# Patient Record
Sex: FEMALE | Race: WHITE | NOT HISPANIC OR LATINO | Employment: OTHER | URBAN - METROPOLITAN AREA
[De-identification: names, ages, dates, MRNs, and addresses within clinical notes are randomized per-mention and may not be internally consistent; named-entity substitution may affect disease eponyms.]

---

## 2021-10-14 ENCOUNTER — TELEPHONE (OUTPATIENT)
Dept: NEUROLOGY | Facility: OTHER | Age: 76
End: 2021-10-14

## 2021-10-14 NOTE — TELEPHONE ENCOUNTER
Patient called to ask if rx was being sent in from . I let her know we sent an rx in today to [facility] on [facility].

## 2021-11-19 ENCOUNTER — TELEPHONE (OUTPATIENT)
Dept: NEUROLOGY | Facility: CLINIC | Age: 76
End: 2021-11-19

## 2021-11-19 NOTE — TELEPHONE ENCOUNTER
Caller: ROBBIE    Relationship: SELF    Best call back number: [phone]    What is the best time to reach you: ANYTIME    Do you know the name of the person who called: NO    What was the call regarding: SHE IS NOT SURE WHO CALLED HER, SHE STATES SHE JUST HAS A MISSED CALL. PLEASE REVIEW AND ADVISE.

## 2021-11-19 NOTE — TELEPHONE ENCOUNTER
Provider: DR. CORRIGAN    Caller: ROBBIE    Relationship to Patient: SELF    Phone Number: [phone]    Reason for Call: PT STATES THAT SHE HAD MISSED A CALL FROM DR. CORRIGAN AND WAS WAITING FOR HIM TO CALL HER BACK.    THEN PT STATED THAT DR. CORRIGAN WAS SUPPOSED TO CONTACT HER ON MON IN REGARDS TO SOME TESTING SHE HAD COMPLETED AND THEY WERE SUPPOSED TO GO OVER RESULTS.  COULD NOT FIND IN CHART WHERE ANYONE HAD CONTACTED PT, BUT SHE IS ASKING FOR A CALL BACK.

## 2021-11-22 NOTE — TELEPHONE ENCOUNTER
Patient was called again and she will go next Thursday to have her Lab work done so it is back before her follow up appoitnment on 12/15/2021 ,Nikki Kurtz

## 2021-11-22 NOTE — TELEPHONE ENCOUNTER
Spoke to her and informed her she is doing a little better and will re-chavira her Labwork when she comes in ,in December 2021 ,Nikki

## 2021-12-17 ENCOUNTER — TELEPHONE (OUTPATIENT)
Dept: NEUROLOGY | Facility: CLINIC | Age: 76
End: 2021-12-17

## 2021-12-17 NOTE — TELEPHONE ENCOUNTER
Caller: REBECA PENN    Relationship: Emergency Contact; SON    Best call back number: [phone]    What was the call regarding: PT'S SON CALLED STATING HE HAS BEEN UNABLE TO ATTEND PT'S NEURO APPTS WITH HER & HE IS WONDERING IF SOMEONE FROM THE OFFICE CAN TOUCH BASES WITH HIM WITH EXPLANATION OF PT'S DIAGNOSES AND WHAT PT'S TREATMENT LOOKS LIKE AT THIS TIME.     PT'S SON STATES HE TRIED DISCUSSING WITH HIS AUNT (PT'S SISTER) WHO ATTENDS APPTS WITH HER BUT THINGS WERE STILL UNCLEAR.    Do you require a callback: YES, PLEASE.    PLEASE REVIEW AND ADVISE.

## 2022-03-07 ENCOUNTER — TELEPHONE (OUTPATIENT)
Dept: NEUROLOGY | Facility: CLINIC | Age: 77
End: 2022-03-07

## 2022-03-07 NOTE — TELEPHONE ENCOUNTER
So I called the sister and explained but what she really wanted to know if she has such severe constipation and I explained to her that is something she has to discuss with her PCP and if she has any other medical concerns please too,unfortunately we only work with the nerves and not all the other Issues ,rodriguez Kurtz

## 2022-03-07 NOTE — TELEPHONE ENCOUNTER
Caller: Genesis Darden    Relationship: Self    Best call back number: [phone] OR SISTERS PHONE # [phone]    What medications are you currently taking:   Current Outpatient Medications on File Prior to Visit   Medication Sig Dispense Refill   • alendronate (FOSAMAX) 70 MG tablet TAKE 1 TABLET BY MOUTH EACH WEEK     • Bioflavonoid Products (VITAMIN C PLUS) 1000 MG tablet Take 1,000 mg by mouth Daily.     • Calcium Carbonate-Vit D-Min (CALCIUM 1200 PO) Take  by mouth Daily.     • Cholecalciferol (VITAMIN D3) 2000 units tablet Take 2,000 Units by mouth Daily.     • cyanocobalamin 1000 MCG/ML injection Inject 1,000 mcg into the appropriate muscle as directed by prescriber Every 30 (Thirty) Days.     • latanoprost (XALATAN) 0.005 % ophthalmic solution Administer 1 drop to both eyes every night at bedtime.     • lubiprostone (Amitiza) 24 MCG capsule Take 1 capsule by mouth 2 (Two) Times a Day With Meals. 60 capsule 11   • methylPREDNISolone (MEDROL) 4 MG dose pack Take as directed on package instructions. 1 each 0     Current Facility-Administered Medications on File Prior to Visit   Medication Dose Route Frequency Provider Last Rate Last Admin   • cyanocobalamin injection 1,000 mcg  1,000 mcg Intramuscular Q28 Days Jackson Rucker II, MD   1,000 mcg at 12/22/21 1010          When did you start taking these medications: NA    Which medication are you concerned about: MEDROL PACK    Who prescribed you this medication:     What are your concerns: PATIENT STATES SHE IS CLOSE TO FINISHING STEROID PACK. SHE STATES SHE TAKES A MEDICATION EVERY Monday CALLED ALENDRONATE AND SHE WANTS TO MAKE SURE ITS OKAY TO TAKE THAT WITH HER STEROID PACK. PLEASE ADVISE.     How long have you had these concerns: NA

## 2022-09-20 ENCOUNTER — TELEPHONE (OUTPATIENT)
Dept: GASTROENTEROLOGY | Facility: CLINIC | Age: 77
End: 2022-09-20

## 2022-09-20 NOTE — TELEPHONE ENCOUNTER
"See notes of 9/13 /22: \"We will give her samples of Linzess 145 daily to trial since she cannot remember how it worked last year.\"    Call to pt.  States took linzess as above - worked well for first 3 days, and then developed \"constant diarrhea\".  Quit taking today, and no further diarrhea.    Update to JAC Verma.   "

## 2022-09-20 NOTE — TELEPHONE ENCOUNTER
I would have her take the Linzess 145 every other day.  If that still causes too much diarrhea we can give her samples of the Linzess 72 to trial instead.

## 2022-09-20 NOTE — TELEPHONE ENCOUNTER
Caller: Genesis Darden    Relationship: Self    Best call back number: [phone]    What is the best time to reach you: ANYTIME    Who are you requesting to speak with (clinical staff, provider,  specific staff member): CLINICAL STAFF     What was the call regarding: PT CALLED AND SAID NIA WU PRESCRIBED HER A MEDICATION TO TRY AND WANTED PT TO CALL BACK AND LET HER KNOW HOW IT WAS GOING. PT STATED THE MEDICATION IS GIVING HER DIARRHEA AND WAS WONDERING IF THERE WAS SOMETHING ELSE THAT COULD BE PRESCRIBED.     Do you require a callback: YES

## 2022-09-21 ENCOUNTER — TELEPHONE (OUTPATIENT)
Dept: GASTROENTEROLOGY | Facility: CLINIC | Age: 77
End: 2022-09-21

## 2022-09-21 NOTE — TELEPHONE ENCOUNTER
Caller: Genesis Darden    Relationship to patient: Self    Best call back number: [phone]     Patient is needing: PATIENT MISSED NURSE CALL AND IS REQUESTING CALL BACK.

## 2022-09-23 ENCOUNTER — TELEPHONE (OUTPATIENT)
Dept: GASTROENTEROLOGY | Facility: CLINIC | Age: 77
End: 2022-09-23

## 2022-09-23 NOTE — TELEPHONE ENCOUNTER
Caller: Genesis Darden    Relationship: Self    Best call back number: [phone]    What is the best time to reach you: ANYTIME    Who are you requesting to speak with (clinical staff, provider,  specific staff member): CLINICAL STAFF        What was the call regarding: PT IS HAVING TROUBLE WITH CONSTIPATION AND PT WAS PRESCRIBED LINZESS 145 MG AND IT GAVE PT DIARRHEA. PT WAS TOLD TO TAKE THEM ONCE EVERY 2 DAYS AND IT STILL GIVES PT DIARRHEA.

## 2022-09-23 NOTE — TELEPHONE ENCOUNTER
I would take her down then to the Numerous 72 daily and try that instead.  If she can come by and  samples that would be great.  If she would rather me send in a prescription I can do that instead.

## 2022-09-23 NOTE — TELEPHONE ENCOUNTER
"Called pt and advised of Guillermina Np\"s note.Advised we will have samples at  for her . Verb understanding .  "

## 2022-10-04 ENCOUNTER — TELEPHONE (OUTPATIENT)
Dept: GASTROENTEROLOGY | Facility: CLINIC | Age: 77
End: 2022-10-04

## 2022-10-04 NOTE — TELEPHONE ENCOUNTER
Caller: Genesis Darden    Relationship to patient: Self    Best call back number: [phone]    Patient is needing: PATIENT MISSED NURSE CALL AND IS REQUESTING CALL BACK.

## 2022-10-04 NOTE — TELEPHONE ENCOUNTER
Caller: Genesis Darden    Relationship: Self    Best call back number:[phone]    What is the best time to reach you: ANY    Who are you requesting to speak with (clinical staff, provider,  specific staff member): BRYCE    Do you know the name of the person who called:     What was the call regarding:GEMTESA 75 MG IS GIVING HER BAD DIARREAH PTWOULD LIKE TO KNOW IF THERE IS SOMETHING ELSE SHE CAN TAKE    Do you require a callback: YES

## 2022-10-05 ENCOUNTER — TELEPHONE (OUTPATIENT)
Dept: GASTROENTEROLOGY | Facility: CLINIC | Age: 77
End: 2022-10-05

## 2022-10-05 NOTE — TELEPHONE ENCOUNTER
Called pt and pt reports that the linzess 72mcg also gave her diarrhea. She took it 3 times and she quit it.  While taking the linzess pt reports having at least 6 very loose to watery stools per day.  Pt had last dose Sunday. Pt has not had a bm since Sunday.  Pt asking what should she do. Advised will send message to Guillermina Anderson.

## 2022-10-05 NOTE — TELEPHONE ENCOUNTER
Caller: Genesis Darden    Relationship: Self    Best call back number: [phone]    What is the best time to reach you: ANYTIME    Who are you requesting to speak with (clinical staff, provider,  specific staff member): CLINICAL STAFF

## 2022-10-05 NOTE — TELEPHONE ENCOUNTER
Called pt and she has ab 5tabs of the linzess 72mg.  She is willing to take it every 3 days to see if this with help, she will start this tomorrow.  That should get her through to her apt on 10/12.

## 2022-10-17 ENCOUNTER — TELEPHONE (OUTPATIENT)
Dept: GASTROENTEROLOGY | Facility: CLINIC | Age: 77
End: 2022-10-17

## 2022-10-17 NOTE — TELEPHONE ENCOUNTER
Can she try taking it every other day or every third day before we give up on it?  Thanks Guillermina ABRAHAM.      Called pt and advised of the above. Verb understanding.

## 2022-10-17 NOTE — TELEPHONE ENCOUNTER
Provider: NIA WU  Caller: ROBBIE PENN  Relationship to Patient: SELF  Phone Number: [phone]  Reason for Call: PATIENT CALLED TO INFORM THE MEDICATION TRULANCE IS GIVING HER DIARRHEA.   PT STATED THIS IS THE THIRD MEDICATION TRIED AND ALL HAVE GIVEN PATIENT DIARRHEA. NEED TO KNOW WHAT TO DO. PLEASE CALL AND ADVISE.

## 2023-02-08 ENCOUNTER — TELEPHONE (OUTPATIENT)
Dept: NEUROLOGY | Facility: CLINIC | Age: 78
End: 2023-02-08

## 2023-02-08 NOTE — TELEPHONE ENCOUNTER
Caller: ROBBIE Rubio call back number: [phone]    What was the call regarding: PT CALLING TO FIND OUT THE NEURO MUSCLE DR YOU WERE REF HER TO ?  ALSO NEEDS  NUMBER SO CAN CALL TO SCHED?    Do you require a callback: YES ASAP     PLEASE ADVISE.

## 2023-10-17 ENCOUNTER — TELEPHONE (OUTPATIENT)
Dept: GASTROENTEROLOGY | Facility: CLINIC | Age: 78
End: 2023-10-17
Payer: MEDICARE

## 2023-10-23 ENCOUNTER — ANESTHESIA (OUTPATIENT)
Dept: GASTROENTEROLOGY | Facility: HOSPITAL | Age: 78
End: 2023-10-23
Payer: MEDICARE

## 2023-10-23 ENCOUNTER — ANESTHESIA EVENT (OUTPATIENT)
Dept: GASTROENTEROLOGY | Facility: HOSPITAL | Age: 78
End: 2023-10-23
Payer: MEDICARE

## 2023-10-23 ENCOUNTER — HOSPITAL ENCOUNTER (OUTPATIENT)
Facility: HOSPITAL | Age: 78
Setting detail: HOSPITAL OUTPATIENT SURGERY
Discharge: HOME | End: 2023-10-23
Attending: INTERNAL MEDICINE | Admitting: INTERNAL MEDICINE
Payer: MEDICARE

## 2023-10-23 VITALS
WEIGHT: 126 LBS | HEART RATE: 81 BPM | OXYGEN SATURATION: 99 % | RESPIRATION RATE: 16 BRPM | DIASTOLIC BLOOD PRESSURE: 69 MMHG | BODY MASS INDEX: 24.74 KG/M2 | HEIGHT: 60 IN | SYSTOLIC BLOOD PRESSURE: 126 MMHG

## 2023-10-23 DIAGNOSIS — R19.5 POSITIVE COLORECTAL CANCER SCREENING USING COLOGUARD TEST: ICD-10-CM

## 2023-10-23 PROCEDURE — 25810000003 LACTATED RINGERS PER 1000 ML: Performed by: INTERNAL MEDICINE

## 2023-10-23 PROCEDURE — 25010000002 PROPOFOL 10 MG/ML EMULSION: Performed by: NURSE ANESTHETIST, CERTIFIED REGISTERED

## 2023-10-23 PROCEDURE — 45380 COLONOSCOPY AND BIOPSY: CPT | Performed by: INTERNAL MEDICINE

## 2023-10-23 PROCEDURE — 88305 TISSUE EXAM BY PATHOLOGIST: CPT | Performed by: INTERNAL MEDICINE

## 2023-10-23 RX ORDER — LIDOCAINE HYDROCHLORIDE 20 MG/ML
INJECTION, SOLUTION INFILTRATION; PERINEURAL AS NEEDED
Status: DISCONTINUED | OUTPATIENT
Start: 2023-10-23 | End: 2023-10-23 | Stop reason: SURG

## 2023-10-23 RX ORDER — SODIUM CHLORIDE, SODIUM LACTATE, POTASSIUM CHLORIDE, CALCIUM CHLORIDE 600; 310; 30; 20 MG/100ML; MG/100ML; MG/100ML; MG/100ML
30 INJECTION, SOLUTION INTRAVENOUS CONTINUOUS PRN
Status: DISCONTINUED | OUTPATIENT
Start: 2023-10-23 | End: 2023-10-23 | Stop reason: HOSPADM

## 2023-10-23 RX ORDER — PROPOFOL 10 MG/ML
VIAL (ML) INTRAVENOUS AS NEEDED
Status: DISCONTINUED | OUTPATIENT
Start: 2023-10-23 | End: 2023-10-23 | Stop reason: SURG

## 2023-10-23 RX ADMIN — PROPOFOL 300 MCG/KG/MIN: 10 INJECTION, EMULSION INTRAVENOUS at 12:59

## 2023-10-23 RX ADMIN — SODIUM CHLORIDE, POTASSIUM CHLORIDE, SODIUM LACTATE AND CALCIUM CHLORIDE 30 ML/HR: 600; 310; 30; 20 INJECTION, SOLUTION INTRAVENOUS at 12:41

## 2023-10-23 RX ADMIN — PROPOFOL 100 MG: 10 INJECTION, EMULSION INTRAVENOUS at 12:59

## 2023-10-23 RX ADMIN — LIDOCAINE HYDROCHLORIDE 100 MG: 20 INJECTION, SOLUTION INFILTRATION; PERINEURAL at 12:59

## 2023-10-23 NOTE — ANESTHESIA PREPROCEDURE EVALUATION
Anesthesia Evaluation     NPO Solid Status: > 8 hours  NPO Liquid Status: > 8 hours           Airway   Mallampati: I  No difficulty expected  Dental      Pulmonary    Cardiovascular   Exercise tolerance: good (4-7 METS)        Neuro/Psych  (+) headaches, numbness  GI/Hepatic/Renal/Endo    (+) GERD    Musculoskeletal     Abdominal    Substance History      OB/GYN          Other   arthritis,   history of cancer                Anesthesia Plan    ASA 3     MAC     intravenous induction     Anesthetic plan, risks, benefits, and alternatives have been provided, discussed and informed consent has been obtained with: patient.    CODE STATUS:

## 2023-10-23 NOTE — DISCHARGE INSTRUCTIONS
For the next 24 hours patient needs to be with a responsible adult.    For 24 hours DO NOT drive, operate machinery, appliances, drink alcohol, make important decisions or sign legal documents.    Start with a light or bland diet if you are feeling sick to your stomach otherwise advance to regular diet as tolerated.    Follow recommendations on procedure report if provided by your doctor.    Call Dr Oneill for problems [phone]     Problems may include but not limited to: large amounts of bleeding, trouble breathing, repeated vomiting, severe unrelieved pain, fever or chills.

## 2023-10-23 NOTE — H&P
Chief Complaint   Patient presents with    Irritable bowel syndrome with constipation         History of Present Illness:   78 y.o. female with chronic constipation.       Her last colonoscopy was in 2019. She does have a history of hyperplastic colon polyps.        C/o fatigue. C/o consitaption x yrs. She takes Ducolax prn. She takes Metamucil gummies. No rectal bleeding or melena. NO abdominal or chest pain. NO nausea or vomtiing. WEight stable.      Medical History        Past Medical History:   Diagnosis Date    Abnormal finding on diagnostic imaging of right kidney       CT 3/2019 IN [facility]    Acid reflux       ON OCC    Arthritis       OSTEO    Cancer      Colon polyp       REMOVED. BENIGN     Constipation      Constipation      CTS (carpal tunnel syndrome)      DDD (degenerative disc disease), lumbosacral      Difficulty walking      Eczema      Headache, tension-type      Hearing loss       WITH MIGUEL HEARING AIDS    Insomnia      Leg weakness, bilateral      Low back pain      Osteoporosis      Overactive bladder      Spinal stenosis      Unsteady gait              Surgical History         Past Surgical History:   Procedure Laterality Date    ANTERIOR CERVICAL FUSION         WITH HARDWARE    BREAST BIOPSY Right       BENIGN    CARPAL TUNNEL RELEASE Bilateral       SECTION         X2    COLONOSCOPY   2013     Diverticulosis, IH, melanosis    COLONOSCOPY N/A 04/15/2019     Diverticulosis, Three 3 to 4 mm polyps in the rectum, IH. Path: Fragments of developing hyperplastic polyp.    CYSTOSCOPY URETEROSCOPY LASER LITHOTRIPSY Right 05/15/2019     Procedure: URETEROSCOPY,CYSTOSCOPY, RIGHT RETROGRADE ;  Surgeon: Enmanuel Sepulveda MD;  Location: [facility]  Service: Urology    FOOT SURGERY Right      HEMORROIDECTOMY        HYSTERECTOMY         ABD WITH REMOVAL OF ONE OVARY    THORACIC LAMINECTOMY N/A 2022     Procedure: Thoracic 10 to thoracic 11 laminectomy using metrx;  Surgeon: Cole  Lonnie CHURCHILL MD;  Location: [facility] OR;  Service: Neurosurgery;  Laterality: N/A;               Current Outpatient Medications:     alendronate (FOSAMAX) 70 MG tablet, Take 1 tablet by mouth Every 7 (Seven) Days., Disp: , Rfl:     Bioflavonoid Products (VITAMIN C PLUS) 1000 MG tablet, Take 1,000 mg by mouth Daily. HOLD FOR SURGERY, Disp: , Rfl:     BISACODYL CO, 1 tablet by Combination route Every Night., Disp: , Rfl:     Calcium Carbonate-Vit D-Min (CALCIUM 1200 PO), Take 1 tablet by mouth Daily. HOLD FOR SURGERY, Disp: , Rfl:     Cholecalciferol (VITAMIN D3) 2000 units tablet, Take 1 tablet by mouth Daily. HOLD FOR SURGERY, Disp: , Rfl:     latanoprost (XALATAN) 0.005 % ophthalmic solution, Administer 1 drop to both eyes every night at bedtime., Disp: , Rfl:     melatonin 5 MG tablet tablet, Take 1 tablet by mouth At Night As Needed., Disp: , Rfl:     vitamin B-12 (CYANOCOBALAMIN) 1000 MCG tablet, Take 1 tablet by mouth Daily. HOLD FOR SURGERY (Patient not taking: Reported on 9/7/2023), Disp: , Rfl:            Allergies   Allergen Reactions    Pyridostigmine Bromide Arrhythmia       Patient states it hurts her stomach a lot                Family History   Problem Relation Age of Onset    Hypertension Mother      Arthritis Mother      Arthritis Sister      Hyperlipidemia Sister      Hypertension Brother      Diabetes Brother      Malig Hyperthermia Neg Hx           Social History   Social History            Socioeconomic History    Marital status:    Tobacco Use    Smoking status: Never    Smokeless tobacco: Never   Vaping Use    Vaping Use: Never used   Substance and Sexual Activity    Alcohol use: Yes       Comment: socially     Drug use: No            Review of Systems   Gastrointestinal:  Positive for constipation.   All other systems reviewed and are negative.  Pertinent positives and negatives documented in the HPI and all other systems reviewed and were found to be negative.      Vitals:     09/07/23  1117   BP: 137/82   Pulse: 80   Temp: 97.1 °F (36.2 °C)   SpO2: 97%         Physical Exam  Vitals reviewed.   Constitutional:       General: She is not in acute distress.     Appearance: Normal appearance. She is well-developed. She is not diaphoretic.   HENT:      Head: Normocephalic and atraumatic. Hair is normal.      Right Ear: Hearing, tympanic membrane, ear canal and external ear normal. No decreased hearing noted. No drainage.      Left Ear: Hearing, tympanic membrane, ear canal and external ear normal. No decreased hearing noted.      Nose: Nose normal. No nasal deformity.      Mouth/Throat:      Mouth: Mucous membranes are moist.   Eyes:      General: Lids are normal.         Right eye: No discharge.         Left eye: No discharge.      Extraocular Movements: Extraocular movements intact.      Conjunctiva/sclera: Conjunctivae normal.      Pupils: Pupils are equal, round, and reactive to light.   Neck:      Thyroid: No thyromegaly.      Vascular: No JVD.      Trachea: No tracheal deviation.   Cardiovascular:      Rate and Rhythm: Normal rate and regular rhythm.      Pulses: Normal pulses.      Heart sounds: Normal heart sounds. No murmur heard.    No friction rub. No gallop.   Pulmonary:      Effort: Pulmonary effort is normal. No respiratory distress.      Breath sounds: Normal breath sounds. No wheezing or rales.   Chest:      Chest wall: No tenderness.   Abdominal:      General: Bowel sounds are normal. There is no distension.      Palpations: Abdomen is soft. There is no mass.      Tenderness: There is no abdominal tenderness. There is no guarding or rebound.      Hernia: No hernia is present.   Genitourinary:     Rectum: Normal. Guaiac result negative.   Musculoskeletal:         General: No tenderness or deformity. Normal range of motion.      Cervical back: Normal range of motion and neck supple.   Lymphadenopathy:      Cervical: No cervical adenopathy.   Skin:     General: Skin is warm and dry.       Findings: No erythema or rash.   Neurological:      Mental Status: She is alert and oriented to person, place, and time.      Cranial Nerves: No cranial nerve deficit.      Motor: No abnormal muscle tone.      Coordination: Coordination normal.      Deep Tendon Reflexes: Reflexes are normal and symmetric. Reflexes normal.   Psychiatric:         Mood and Affect: Mood normal.         Behavior: Behavior normal.         Thought Content: Thought content normal.         Judgment: Judgment normal.         Diagnoses and all orders for this visit:     1. Constipation, unspecified constipation type (Primary)  -     CBC & Differential  -     Comprehensive Metabolic Panel  -     TSH  -     Cologuard - Stool, Per Rectum; Future     2. Encounter for screening for malignant neoplasm of colon  -     Cologuard - Stool, Per Rectum; Future        Assessment:     Constipation        Recommendations:  Labs: TSH, CBC, CMP  She is not interested in a colonoscopy  ColoGuard  Think about eating prunes 2 daily.  F/u 6 mos.      No follow-ups on file.     10/23/23 - No change from the above H and P.    Matteo Oneill MD

## 2023-10-23 NOTE — ANESTHESIA POSTPROCEDURE EVALUATION
Patient: Rasheeda Darden    Procedure Summary       Date: 10/23/23 Room / Location: [facility] ENDOSCOPY 1 /  [facility]    Anesthesia Start: 1255 Anesthesia Stop: 1326    Procedure: COLONOSCOPY TO CECUM & T.I. WITH COLD POLYPECTOMIES Diagnosis:       Positive colorectal cancer screening using Cologuard test      (Positive colorectal cancer screening using Cologuard test [R19.5])    Surgeons: Matteo Oneill MD Provider: Geoffrey Valencia MD    Anesthesia Type: MAC ASA Status: 3            Anesthesia Type: MAC    Vitals  Vitals Value Taken Time   /69 10/23/23 1345   Temp     Pulse 78 10/23/23 1347   Resp 16 10/23/23 1344   SpO2 98 % 10/23/23 1347   Vitals shown include unfiled device data.        Post Anesthesia Care and Evaluation    Patient location during evaluation: PACU  Patient participation: complete - patient participated  Level of consciousness: awake and alert  Pain management: adequate    Airway patency: patent  Anesthetic complications: No anesthetic complications    Cardiovascular status: acceptable  Respiratory status: acceptable  Hydration status: acceptable    Comments: --------------------            10/23/23               1344     --------------------   BP:       126/69     Pulse:      81       Resp:       16       SpO2:      99%      --------------------

## 2023-10-24 LAB
LAB AP CASE REPORT: NORMAL
PATH REPORT.FINAL DX SPEC: NORMAL
PATH REPORT.GROSS SPEC: NORMAL

## 2023-10-25 ENCOUNTER — HOSPITAL ENCOUNTER (OUTPATIENT)
Dept: MAMMOGRAPHY | Facility: HOSPITAL | Age: 78
Discharge: HOME | End: 2023-10-25
Admitting: NURSE PRACTITIONER
Payer: MEDICARE

## 2023-10-25 DIAGNOSIS — Z12.31 VISIT FOR SCREENING MAMMOGRAM: ICD-10-CM

## 2023-10-25 PROCEDURE — 77067 SCR MAMMO BI INCL CAD: CPT

## 2023-10-25 PROCEDURE — 77063 BREAST TOMOSYNTHESIS BI: CPT

## 2023-10-29 NOTE — PROGRESS NOTES
10/29/23       Her that the colon polyps that were removed were not cancerous and not precancerous, which is good.  I would recommend that she have a repeat colonoscopy in 5 years.  Please send a copy of this report to her PCP.  Frannie alvares

## 2023-10-30 ENCOUNTER — TELEPHONE (OUTPATIENT)
Dept: GASTROENTEROLOGY | Facility: CLINIC | Age: 78
End: 2023-10-30
Payer: MEDICARE

## 2023-10-30 NOTE — TELEPHONE ENCOUNTER
Called pt, left vm to call back.     Colonoscopy placed in  and recall for 10/23/28.    Copy of report sent to PCP via [employer].

## 2023-10-30 NOTE — TELEPHONE ENCOUNTER
----- Message from Matteo Oneill MD sent at 10/29/2023  4:37 PM EDT -----  10/29/23       Her that the colon polyps that were removed were not cancerous and not precancerous, which is good.  I would recommend that she have a repeat colonoscopy in 5 years.  Please send a copy of this report to her PCP.  Thx. kjh

## 2023-10-31 ENCOUNTER — TELEPHONE (OUTPATIENT)
Dept: GASTROENTEROLOGY | Facility: CLINIC | Age: 78
End: 2023-10-31

## 2023-10-31 ENCOUNTER — TELEPHONE (OUTPATIENT)
Dept: GASTROENTEROLOGY | Facility: CLINIC | Age: 78
End: 2023-10-31
Payer: MEDICARE

## 2023-10-31 NOTE — TELEPHONE ENCOUNTER
Hub staff attempted to follow warm transfer process and was unsuccessful      Caller: Rasheeda Darden     Relationship to patient: Self     Best call back number: [phone]     Patient is needing: PT IS RETURNING CALL ABOUT TEST RESULTS. PT SAID THE CALL DROPPED.

## 2023-10-31 NOTE — TELEPHONE ENCOUNTER
PT IS RETURNING ANU'S CALL ABOUT RESULTS.  SHE WILL NOT BE HOME UNTIL AFTER 12PM TODAY.  SHE WOULD LIKE A CALL BACK

## 2023-10-31 NOTE — TELEPHONE ENCOUNTER
Hub staff attempted to follow warm transfer process and was unsuccessful     Caller: Rasheeda Darden    Relationship to patient: Self    Best call back number: [phone]    Patient is needing: PT IS RETURNING CALL ABOUT TEST RESULTS. PT SAID THE CALL DROPPED.

## 2023-12-14 ENCOUNTER — OFFICE VISIT (OUTPATIENT)
Dept: NEUROLOGY | Facility: CLINIC | Age: 78
End: 2023-12-14
Payer: MEDICARE

## 2023-12-14 VITALS
WEIGHT: 130 LBS | HEIGHT: 60 IN | DIASTOLIC BLOOD PRESSURE: 82 MMHG | BODY MASS INDEX: 25.52 KG/M2 | RESPIRATION RATE: 16 BRPM | HEART RATE: 92 BPM | OXYGEN SATURATION: 98 % | SYSTOLIC BLOOD PRESSURE: 150 MMHG

## 2023-12-14 DIAGNOSIS — R53.1 WEAKNESS: ICD-10-CM

## 2023-12-14 DIAGNOSIS — M48.061 SPINAL STENOSIS OF LUMBAR REGION, UNSPECIFIED WHETHER NEUROGENIC CLAUDICATION PRESENT: Primary | ICD-10-CM

## 2023-12-14 DIAGNOSIS — M48.04 THORACIC STENOSIS: ICD-10-CM

## 2023-12-14 DIAGNOSIS — Z91.81 POTENTIAL FOR FALLS: ICD-10-CM

## 2023-12-14 NOTE — PROGRESS NOTES
"Chief Complaint   Patient presents with    thoracic stenosis       Patient ID: Genesis Darden is a 78 y.o. female.    HPI:  Interval history:  The patient is a 78-year-old female who presents to neurology clinic for followup of thoracic stenosis causing weakness. She has stenosis at multiple levels of her spine including thoracic stenosis with moderate cord compression, which was decompressed. In addition, the MRI of the cervical spine shows prior anterior cervical discectomy and fusion procedure at C4-C5. A CT scan of her lumbar spine showed severe central stenosis at L3-L4 and mild central stenosis at L2-L3. She mentioned some lower back pain that is radiating down to her bilateral lower extremities. She also notices potentially some changes in her bowel and bladder control, which I will clarify. After this imaging, which was obtained by neurosurgery, they discussed a multilevel decompression and fusion in the lumbar spine.     The following portions of the patient's history were reviewed and updated as appropriate: allergies, current medications, past family history, past medical history, past social history, past surgical history and problem list.    The patient reports that she started an antidepressant medication and cannot recall the name of the medication, which was prescribed by a provider in Dr. Clara Pallares' office approximately 1 month ago. The adult female notes that the patient experiences sadness around Christmastime, as she lost her  8 years ago, shortly before Mitul. The patient will follow up with Dr. Pallares on 12/15/2023.     The patient experienced constipation prior to starting an antidepressant; however, her constipation has worsened since starting the antidepressant. Constipation is listed as a side effect of the antidepressant. She takes Colace nightly. She previously took bisacodyl.     The patient reports that she continues to be slightly \"wobbly\" with ambulation. She " "participated in physical therapy recently, which was discontinued as she was not significantly improving. She participates in aquatic therapy and continues to perform her physical therapy exercises at home. The adult female states that the patient is concerned about new pain in her bilateral buttocks/lower extremities, which the patient states started approximately 1.5 months ago. She underwent myelogram in 2022, ordered by Dr. Galvan. Dr. Galvan discussed back surgery with an anterior approach, which the patient is hesitant to pursue. She denies current pain in her bilateral lower extremities, though this tends to occur daily. This pain is severe at times, and she will take ibuprofen which is helpful. The pain radiating into her bilateral lower extremities sometimes prevents her from leaving home. She has completed occupational therapy. She last followed up with Dr. Galvan approximately 1 year ago.    The patient was evaluated by Dr. Cao, who arranged repetitive nerve stimulation test with Dr. Matute at [facility] which determined the patient does not have myasthenia gravis or neuropathy in her bilateral feet. She was diagnosed with carpal tunnel syndrome/median neuropathy affecting her right wrist. She has difficulty writing and is uncertain if this is related to carpal tunnel. She denies difficulty holding a pencil and reports difficulty \"making the letters.\" The patient reports bilateral upper extremity weakness and right hand weakness. She underwent surgery for bilateral carpal tunnel syndrome years ago. She is right-hand dominant.    The patient intermittently experiences difficulty urinating. She will feel as though she needs to urinate and not be able to urinate.    She denies diplopia with moving her bilateral eyes left and right. The patient is trying to find a new ophthalmologist. The adult female states that the patient underwent laser surgery for \"pressure in her eyes.\"    The patient reports " "near-falls approximately twice since her last visit. On one occasion, she leaned forward to retrieve mail from the mailbox, nearly fell, and caught herself. She fell in the shower 3 to 4 months ago when she missed her shower seat.     The patient states that she develops weakness of her bilateral lower extremities if she ambulates \"too far,\" such as around the grocery store. She leans forward on the cart, which is helpful. The adult female notes that the patient leans forward while ambulating and does not realize she is doing this. She notices that the patient's right shoulder \"droops down more\" with ambulation, and her gait is different with the right lower extremity than the left lower extremity. The patient thinks her gait is stable but her friend thinks her gait has changed. The patient has not used her cane much and typically uses a rolling walker with a seat for ambulating long distances. The adult female states that the patient ambulated in a more upright position initially following thoracic surgery, and as time has progressed, she returned to leaning forward more with ambulation, which exacerbates her balance issues. The patient denies any numbness around her chest or abdomen. She underwent recent brain MRI, ordered by Dr. Cao.    The patient was able to obtain a disabled parking permit.        Review of Systems   HENT:  Negative for tinnitus and trouble swallowing.    Gastrointestinal:  Negative for nausea and vomiting.   Genitourinary:  Positive for urgency (but sometimes nothing comes out).   Musculoskeletal:  Positive for back pain, gait problem and neck pain. Negative for neck stiffness.   Neurological:  Positive for weakness and light-headedness.   Psychiatric/Behavioral:  Negative for agitation, confusion and decreased concentration.         Vitals:    12/14/23 1112   BP: 150/82   Pulse: 92   Resp: 16   SpO2: 98%       Neurologic Exam     Mental Status   Attention: normal. Concentration: normal. "   Speech: speech is normal   Level of consciousness: alert    Cranial Nerves     CN II   Visual fields full to confrontation.   Visual acuity: normal    CN III, IV, VI   Pupils are equal, round, and reactive to light.  Extraocular motions are normal.     CN V   Facial sensation intact.     CN VII   Facial expression full, symmetric.     CN VIII   Hearing: intact    CN IX, X   Palate: symmetric    CN XI   Right trapezius strength: normal  Left trapezius strength: normal    CN XII   Tongue: not atrophic  Fasciculations: absent  Tongue deviation: none  No double vision on horizontal or vertical gaze with intact extraocular movements. Left eye possible mild ptosis, though still with some structural changes above left eye. Strong eye closure. Slightly disconjugate gaze.       Motor Exam   Muscle bulk: normal    Strength   Right deltoid: 5/5  Left deltoid: 5/5  Right biceps: 5/5  Left biceps: 5/5  Right triceps: 5/5  Left triceps: 5/5  Right iliopsoas: 4/5  Left iliopsoas: 4/5  Right quadriceps: 4/5  Left quadriceps: 5/5  Right hamstrin/5  Left hamstrin/5  Right anterior tibial: 4/5  Left anterior tibial: 5/5  Right gastroc: 5/5  Left gastroc: 5/5Grip 5/5 bilaterally. R>L iliopsoas. R quad/ham 4+/5.     Sensory Exam   Right arm light touch: normal  Left arm light touch: normal  Right leg light touch: normal  Left leg light touch: normal  No numbness to abdomen     Gait, Coordination, and Reflexes     Coordination   Finger to nose coordination: normal  Heel to shin coordination: normal    Reflexes   Right brachioradialis: 2+  Left brachioradialis: 2+  Right biceps: 2+  Left biceps: 2+  Right patellar: 3+  Left patellar: 3+  Right achilles: 2+  Left achilles: 2+      Physical Exam  Eyes:      Extraocular Movements: EOM normal.      Pupils: Pupils are equal, round, and reactive to light.   Neurological:      Coordination: Finger-Nose-Finger Test and Heel to Shin Test normal.      Deep Tendon Reflexes:      Reflex  Scores:       Bicep reflexes are 2+ on the right side and 2+ on the left side.       Brachioradialis reflexes are 2+ on the right side and 2+ on the left side.       Patellar reflexes are 3+ on the right side and 3+ on the left side.       Achilles reflexes are 2+ on the right side and 2+ on the left side.  Psychiatric:         Speech: Speech normal.         Procedures    Assessment/Plan:         Diagnoses and all orders for this visit:    1. Spinal stenosis of lumbar region, unspecified whether neurogenic claudication present (Primary)  -     MRI Lumbar Spine Without Contrast; Future    2. Thoracic stenosis  -     MRI Lumbar Spine Without Contrast; Future    3. Potential for falls  -     MRI Lumbar Spine Without Contrast; Future    4. Weakness  -     MRI Lumbar Spine Without Contrast; Future    Duloxetine and venlafaxine were discussed as potential treatment options for both depression and back pain/neuropathic pain, and she was advised to discuss these with the medical provider prescribing her antidepressant. Potential side effects of duloxetine and venlafaxine were discussed.   The patient was advised to continue her physical therapy exercises at home.    She was offered referral to a hand surgeon for evaluation of right hand weakness and to discuss injection treatment versus risks and benefits of a carpal tunnel release. No referral was made after discussion.    Gabapentin 100 mg 3 times daily was discussed as a treatment option for her back pain radiating into bilateral lower extremities; however, there is concern from pt regarding the potential side effect of suicidality during this difficult time of year for the patient. MRI of the lumbar spine will be completed. She will follow up with Dr. Galvan for discussion of further imaging/surgery. She was advised to discuss increased ambulation difficulties, bladder/bowel issues, and risks and benefits of surgery with Dr. Galvan. The patient was encouraged to contact  this practice if she encounters difficulty obtaining an appointment with Dr. Galvan. We will reimage the lower back as she has noticed lower extremity symptoms with back pain.    The patient will follow up in 6 months.     I spent at least 40 minutes caring for this patient on this date of service. This time includes time spent by me in the following activities as necessary: preparing for the visit, reviewing tests, medical records and previous visits, obtaining and/or reviewing a separately obtained history, performing a medically appropriate exam and/or evaluation, counseling and educating the patient, and/or communicating with other healthcare professionals, documenting information in the medical record, independently interpreting results and communicating that information with the patient, and developing a medically appropriate treatment plan with consideration of other conditions, medications, and treatments.    Roderick Kilpatrick MD      Transcribed from ambient dictation for Roderick Kilpatrick MD by Maggie Islas.  12/14/23   16:01 EST    Patient or patient representative verbalized consent to the visit recording.  I have personally performed the services described in this document as transcribed by the above individual, and it is both accurate and complete.  Roderick Kilpatrick MD  12/24/2023  15:13 EST

## 2024-03-07 ENCOUNTER — HOSPITAL ENCOUNTER (OUTPATIENT)
Dept: MRI IMAGING | Facility: HOSPITAL | Age: 79
Discharge: HOME | End: 2024-03-07
Admitting: STUDENT IN AN ORGANIZED HEALTH CARE EDUCATION/TRAINING PROGRAM
Payer: MEDICARE

## 2024-03-07 DIAGNOSIS — Z91.81 POTENTIAL FOR FALLS: ICD-10-CM

## 2024-03-07 DIAGNOSIS — R53.1 WEAKNESS: ICD-10-CM

## 2024-03-07 DIAGNOSIS — M48.04 THORACIC STENOSIS: ICD-10-CM

## 2024-03-07 DIAGNOSIS — M48.061 SPINAL STENOSIS OF LUMBAR REGION, UNSPECIFIED WHETHER NEUROGENIC CLAUDICATION PRESENT: ICD-10-CM

## 2024-03-07 PROCEDURE — 72148 MRI LUMBAR SPINE W/O DYE: CPT

## 2024-04-09 ENCOUNTER — HOSPITAL ENCOUNTER (OUTPATIENT)
Dept: BONE DENSITY | Facility: HOSPITAL | Age: 79
Discharge: HOME | End: 2024-04-09
Admitting: NURSE PRACTITIONER
Payer: MEDICARE

## 2024-04-09 DIAGNOSIS — Z78.0 POSTMENOPAUSAL: ICD-10-CM

## 2024-04-09 PROCEDURE — 77080 DXA BONE DENSITY AXIAL: CPT

## 2024-04-19 ENCOUNTER — TRANSCRIBE ORDERS (OUTPATIENT)
Dept: ADMINISTRATIVE | Facility: HOSPITAL | Age: 79
End: 2024-04-19
Payer: MEDICARE

## 2024-04-19 DIAGNOSIS — M81.0 OSTEOPOROSIS, UNSPECIFIED OSTEOPOROSIS TYPE, UNSPECIFIED PATHOLOGICAL FRACTURE PRESENCE: Primary | ICD-10-CM

## 2024-08-22 ENCOUNTER — TELEPHONE (OUTPATIENT)
Dept: NEUROLOGY | Facility: CLINIC | Age: 79
End: 2024-08-22
Payer: MEDICARE

## 2024-08-22 NOTE — TELEPHONE ENCOUNTER
Left a message for patient to call back to find out what kind of test she had done to get a sooner appointment

## 2024-08-22 NOTE — TELEPHONE ENCOUNTER
Provider: MEENU    Caller: PATIENT    Relationship to Patient: SELF    Phone Number: [phone]    Reason for Call: PT WAS SEEN BY ENT, JOSE JUAN ROMERO MD, ON 8/14/24 AND HAD A TEST YESTERDAY AND WAS TOLD SHE NEEDED TO BE SEEN BY HER NEUROLOGIST AS SOON AS POSSIBLE REGARDING HER BALANCE ISSUES.  SHE HAS FELL SEVERAL TIMES, THE LAST TIME ABOUT TWO WEEKS AGO. THERE IS NOTHING AVAILABLE ANY SOONER THAT HER SCHEDULED APPT IN DECEMBER.     PLEASE CALL PT TO ADVISE     THANK YOU

## 2024-08-30 ENCOUNTER — TRANSCRIBE ORDERS (OUTPATIENT)
Dept: ADMINISTRATIVE | Facility: HOSPITAL | Age: 79
End: 2024-08-30
Payer: MEDICARE

## 2024-08-30 DIAGNOSIS — H53.2 DIPLOPIA: Primary | ICD-10-CM

## 2024-09-27 ENCOUNTER — TELEPHONE (OUTPATIENT)
Dept: NEUROLOGY | Facility: CLINIC | Age: 79
End: 2024-09-27
Payer: MEDICARE

## 2024-10-04 ENCOUNTER — HOSPITAL ENCOUNTER (OUTPATIENT)
Dept: MRI IMAGING | Facility: HOSPITAL | Age: 79
Discharge: HOME | End: 2024-10-04
Payer: MEDICARE

## 2024-10-04 DIAGNOSIS — H53.2 DIPLOPIA: ICD-10-CM

## 2024-10-04 PROCEDURE — 70543 MRI ORBT/FAC/NCK W/O &W/DYE: CPT

## 2024-10-04 PROCEDURE — 0 GADOBENATE DIMEGLUMINE 529 MG/ML SOLUTION: Performed by: OPTOMETRIST

## 2024-10-04 PROCEDURE — A9577 INJ MULTIHANCE: HCPCS | Performed by: OPTOMETRIST

## 2024-10-04 PROCEDURE — 70553 MRI BRAIN STEM W/O & W/DYE: CPT

## 2024-10-04 RX ADMIN — GADOBENATE DIMEGLUMINE 12 ML: 529 INJECTION, SOLUTION INTRAVENOUS at 19:39

## 2024-10-31 ENCOUNTER — OFFICE VISIT (OUTPATIENT)
Dept: NEUROLOGY | Facility: CLINIC | Age: 79
End: 2024-10-31
Payer: MEDICARE

## 2024-10-31 VITALS
HEART RATE: 86 BPM | BODY MASS INDEX: 24.74 KG/M2 | HEIGHT: 60 IN | WEIGHT: 126 LBS | OXYGEN SATURATION: 99 % | SYSTOLIC BLOOD PRESSURE: 126 MMHG | DIASTOLIC BLOOD PRESSURE: 70 MMHG

## 2024-10-31 DIAGNOSIS — Z91.81 POTENTIAL FOR FALLS: ICD-10-CM

## 2024-10-31 DIAGNOSIS — R27.8 DOES NOT BALANCE: ICD-10-CM

## 2024-10-31 DIAGNOSIS — G62.9 NEUROPATHY: ICD-10-CM

## 2024-10-31 DIAGNOSIS — M48.061 SPINAL STENOSIS OF LUMBAR REGION, UNSPECIFIED WHETHER NEUROGENIC CLAUDICATION PRESENT: Primary | ICD-10-CM

## 2024-10-31 DIAGNOSIS — M48.04 THORACIC STENOSIS: ICD-10-CM

## 2024-10-31 RX ORDER — VENLAFAXINE HYDROCHLORIDE 37.5 MG/1
1 CAPSULE, EXTENDED RELEASE ORAL DAILY
COMMUNITY
Start: 2024-10-17
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
## 2024-11-01 LAB
FOLATE SERPL-MCNC: 9.7 NG/ML
TSH SERPL DL<=0.005 MIU/L-ACNC: 1.86 UIU/ML (ref 0.45–4.5)
VIT B12 SERPL-MCNC: 1180 PG/ML (ref 232–1245)

## 2025-06-26 ENCOUNTER — OFFICE VISIT (OUTPATIENT)
Dept: NEUROLOGY | Facility: CLINIC | Age: 80
End: 2025-06-26
Payer: MEDICARE

## 2025-06-26 VITALS
DIASTOLIC BLOOD PRESSURE: 68 MMHG | WEIGHT: 126 LBS | HEIGHT: 60 IN | SYSTOLIC BLOOD PRESSURE: 104 MMHG | BODY MASS INDEX: 24.74 KG/M2 | HEART RATE: 78 BPM

## 2025-06-26 DIAGNOSIS — M48.04 THORACIC STENOSIS: Primary | ICD-10-CM

## 2025-06-26 PROCEDURE — 99212 OFFICE O/P EST SF 10 MIN: CPT | Performed by: STUDENT IN AN ORGANIZED HEALTH CARE EDUCATION/TRAINING PROGRAM

## 2025-06-26 PROCEDURE — 1159F MED LIST DOCD IN RCRD: CPT | Performed by: STUDENT IN AN ORGANIZED HEALTH CARE EDUCATION/TRAINING PROGRAM

## 2025-06-26 PROCEDURE — 1160F RVW MEDS BY RX/DR IN RCRD: CPT | Performed by: STUDENT IN AN ORGANIZED HEALTH CARE EDUCATION/TRAINING PROGRAM

## 2025-06-26 RX ORDER — OMEPRAZOLE 40 MG/1
40 CAPSULE, DELAYED RELEASE ORAL
COMMUNITY
Start: 2025-05-27

## 2025-06-26 RX ORDER — TIZANIDINE 2 MG/1
2 TABLET ORAL
COMMUNITY
Start: 2025-05-27

## 2025-06-26 NOTE — PROGRESS NOTES
Chief Complaint   Patient presents with    thoracic stenosis     Reports having surgery at [facility]- doing well- had surgery 5/14 doing well. In PT on walker, just had her collar removed.        Patient ID: Genesis Darden is a 79 y.o. female.    HPI:    The following portions of the patient's history were reviewed and updated as appropriate: allergies, current medications, past family history, past medical history, past social history, past surgical history and problem list.    Interval history:    Review of Systems   Neurological:  Positive for weakness. Negative for dizziness, tremors, seizures, syncope, facial asymmetry, speech difficulty, light-headedness, numbness and headaches.   Psychiatric/Behavioral:  Negative for agitation, behavioral problems, confusion, decreased concentration, dysphoric mood, hallucinations, self-injury, sleep disturbance and suicidal ideas. The patient is nervous/anxious. The patient is not hyperactive.       History of Present Illness  The patient presents to the neurology clinic for a follow-up visit. She has a history of thoracic spinal stenosis and has undergone cervical fusion as well as thoracic spine decompression. She is accompanied by her sister.    She was referred to the [facility] for evaluation of spinal surgery and was seen by Dr. Garcia. During this visit, she had a C2 through C5 posterior instrumented fusion with laminectomy on 05/14/2025. She reported she was doing well a couple of days ago. She had a previous anterior cervical discectomy and fusion (ACDF) in the past. She reports an improvement in her condition following the surgery, noting enhanced stability when leaning over, improved ambulation, and increased strength in her legs. However, she acknowledges persistent weakness in her arms and hands, despite some improvement. Her sister recalls that the patient experienced significant pain during her hospital stay, which was managed with Flexeril, but this  medication was discontinued due to suspected delirium. She is currently undergoing physical therapy and has been advised against lifting weights exceeding 10 pounds. She has been wearing a neck collar for the past 6 weeks and experiences mild neck pain. She utilizes a rolling walker for mobility at home.     PAST SURGICAL HISTORY: She had a C2 through C5 posterior instrumented fusion with laminectomy on 05/14/2025 and a previous anterior cervical discectomy and fusion (ACDF).        Vitals:    06/26/25 1119   BP: 104/68   Pulse: 78       Neurological Exam  Mental Status  Alert.    Cranial Nerves  CN II: Right normal visual field. Left normal visual field.  CN III, IV, VI: Extraocular movements intact bilaterally. Pupils equal round and reactive to light bilaterally.  CN V:  Right: Facial sensation is normal.  Left: Facial sensation is normal on the left.  CN VII:  Left: There is no facial weakness.  CN IX, X:  Right: Palate is normal.  Left: Palate is normal.  CN XI:  Right: Sternocleidomastoid strength is normal.  Left: Sternocleidomastoid strength is normal.  CN XII: Tongue midline without atrophy or fasciculations.  Normal hearing to finger rub bilaterally. No double vision on horizontal or vertical gaze with intact extraocular movements. Left eye possible mild ptosis, though still with some structural changes above left eye. Slightly disconjugate gaze. Left smile weaker than right with left nasolabial fold flattening - pt had flattening in 2023 with no stroke or tumor in 2024 MRI..    Motor                                               Right                     Left  Deltoid                                   5                          5   Biceps                                   5                          5   Triceps                                  5                          5   Iliopsoas                               5                          5   Quadriceps                           5                           5   Hamstring                             5                          5   Gastrocnemius                     5                           5   Anterior tibialis                      5                          5    Sensory  Light touch is normal in upper and lower extremities.   Prev Decr vibration L toes > right toes.    Reflexes                                            Right                      Left  Brachioradialis                    2+                         2+  Biceps                                 2+                         2+  Patellar                                2+                         2+  Achilles                                0                         0    Coordination  Right: Finger-to-nose normal. Heel-to-shin normal.Left: Finger-to-nose normal. Heel-to-shin normal.    Gait    Uses rolling walker at home..      Physical Exam  Eyes:      Extraocular Movements: Extraocular movements intact.      Pupils: Pupils are equal, round, and reactive to light.   Neurological:      Mental Status: She is alert.      Deep Tendon Reflexes:      Reflex Scores:       Bicep reflexes are 2+ on the right side and 2+ on the left side.       Brachioradialis reflexes are 2+ on the right side and 2+ on the left side.       Patellar reflexes are 2+ on the right side and 2+ on the left side.       Achilles reflexes are 0 on the right side and 0 on the left side.        Procedures    Assessment/Plan:    Assessment & Plan  1. Postoperative status following cervical fusion and thoracic spine decompression.  She has demonstrated significant improvement in her condition post-surgery, with enhanced stability during ambulation and increased leg strength. However, she continues to experience residual weakness in her arms and hands. Her neurological examination today shows improvement compared to the previous visit, with better strength in her legs and less hyperreflexia in her knees. She is advised to persist with her physical  therapy regimen to further augment her strengthShe is on venlafaxine for depression and nerve pain. Her primary care doctor mentioned that she is on a low dose and plans to increase it during the next visit. She is advised to consult with her primary care physician regarding the possibility of increasing her venlafaxine dosage to 75 mg if her nerve pain remains inadequately managed. The addition of gabapentin could be considered, but she has expressed concerns about potential side effects, including depression and suicidal ideation.    Follow-up  The patient will follow up in 6 months.   I spent 17 minutes caring for this patient on this date of service. This time includes time spent by me in the following activities as necessary: preparing for the visit, reviewing tests, medical records and previous visits, obtaining and/or reviewing a separately obtained history, performing a medically appropriate exam and/or evaluation, counseling and educating the patient, and/or communicating with other healthcare professionals, documenting information in the medical record, independently interpreting results and communicating that information with the patient, and developing a medically appropriate treatment plan with consideration of other conditions, medications, and treatments.    Patient or patient representative verbalized consent for the use of Ambient Listening during the visit with  Roderick Kilpatrick MD for chart documentation. 6/30/2025  18:51 EDT     Diagnoses and all orders for this visit:    1. Thoracic stenosis (Primary)           Roderick Kilpatrick MD

## (undated) DEVICE — SINGLE-USE BIOPSY FORCEPS: Brand: RADIAL JAW 4

## (undated) DEVICE — SENSR O2 OXIMAX FNGR A/ 18IN NONSTR

## (undated) DEVICE — CANN O2 ETCO2 FITS ALL CONN CO2 SMPL A/ 7IN DISP LF

## (undated) DEVICE — KT ORCA ORCAPOD DISP STRL

## (undated) DEVICE — ADAPT CLN BIOGUARD AIR/H2O DISP

## (undated) DEVICE — LN SMPL CO2 SHTRM SD STREAM W/M LUER

## (undated) DEVICE — TUBING, SUCTION, 1/4" X 10', STRAIGHT: Brand: MEDLINE